# Patient Record
Sex: FEMALE | Race: WHITE | Employment: FULL TIME | URBAN - METROPOLITAN AREA
[De-identification: names, ages, dates, MRNs, and addresses within clinical notes are randomized per-mention and may not be internally consistent; named-entity substitution may affect disease eponyms.]

---

## 2024-04-17 ENCOUNTER — TRANSFERRED RECORDS (OUTPATIENT)
Dept: MULTI SPECIALTY CLINIC | Facility: CLINIC | Age: 59
End: 2024-04-17

## 2024-04-17 LAB
ALT SERPL-CCNC: 15 U/L
AST SERPL-CCNC: 26 U/L (ref 14–36)
CREATININE (EXTERNAL): 0.7 MG/DL (ref 0.7–1.4)
GLUCOSE (EXTERNAL): 86 MG/DL (ref 60–99)
POTASSIUM (EXTERNAL): 4.3 MMOL/L (ref 3.5–5.1)

## 2024-04-26 RX ORDER — CLOBETASOL PROPIONATE 0.5 MG/G
CREAM TOPICAL 2 TIMES DAILY
Status: ON HOLD | COMMUNITY
End: 2024-04-30

## 2024-04-26 RX ORDER — GABAPENTIN 100 MG/1
100 CAPSULE ORAL 3 TIMES DAILY
Status: ON HOLD | COMMUNITY
End: 2024-04-30

## 2024-04-26 RX ORDER — ESTRADIOL 0.1 MG/G
CREAM VAGINAL
Status: ON HOLD | COMMUNITY
End: 2024-04-30

## 2024-04-26 RX ORDER — AMOXICILLIN 500 MG/1
2000 TABLET, FILM COATED ORAL 2 TIMES DAILY
Status: ON HOLD | COMMUNITY
End: 2024-04-30

## 2024-04-26 RX ORDER — SUMATRIPTAN 50 MG/1
50 TABLET, FILM COATED ORAL
COMMUNITY

## 2024-04-28 NOTE — CONSULTS
GYNECOLOGIC ONCOLOGY CONSULT    Patient Name: MAYUR PINEDA Patient : 1965  Patient MRN: [id number]  Referring Physician: UBALDO Resendiz  Primary GYNOncologist: Ashley Augustine (Gynecological/Oncology)  Date of Service: 2024    Reason for Consult:  Treatment recommendations    History of Present Illness (Gyn Oncology):  Mayur Pineda is a 58-year-old female with a history of abdominal pain and bloating, who was identified to have a complex, solid-appearing, right ovarian mass, measuring up to 10.8 cm on a pelvic ultrasound 4/3/24. She presents today for treatment recommendations.    Clinical Course:  10/27/17: Mayur underwent a hysteroscopy, D&C with polypectomy for a history of postmenopausal bleeding. Pathology showed fragments of endometrial polyp and benign endometrium. Negative for atypia, hyperplasia, or malignancy.  2018: Pap smear NILM. HPV+.  2019: Pap smear NILM. HPV-.  23-23: She presented to ER with abdominal pain and nausea for 4 days. She has a history of gastric ulcers and anemia. She reports decrease in solid and fluid intake. She reports weakness. She was found to have upper GI bleed, related to gastric ulcer, treated with epi, bipolar cautery and 2x hemostatic clips. She was also found to have worsening anemia.  23: CT AP showed wall thickening of the gastric pylorus, suspicious for peptic ulcer disease. There is a curvilinear hyperdensity within the gastric lumen in this region suspicious for active hemorrhage.  8/3/23: She underwent an EGD, which was normal. Plan to continue PPI, send stool H. Pylori antigen and repeat EGD in 2 months to ensure adequate healing. She should continue to avoid NSAID medications.  23: She underwent an EGD. Pathology showed changes consistent with reactive gastropathy with focal active inflammation. Negative for Helicobacter pylori by immunostain.  10/5/23: She presented with urinary incontinence and was using Pose liners.  "She was also noticing accidental gas leakage. She was concerned that her perineum had \"disappeared\" and wanted to have it looked it. She was using an estrogen cream for it 2x a week as well as clobetasol.  Pap smear NILM. HPV-.  10/9/23: Abdominal ultrasound showed a circumscribed, solid-appearing, subcutaneous mass of the right lower abdominal quadrant at site of clinical concern does not have typical appearance of a lymph node. It measures 4 x 4.9 cm in cross-section and has a thickness of 1.3 cm. It could be a lipoma though appearance is nonspecific. No hernia demonstrated.  4/1/24: She presented with low back pain and abdominal bloating. She helped somebody move towards the end of last week and then she started to have low back pain after lifting something heavy. She feels like the pain is getting better. She has taken Tylenol for pain without improvement and a use of heat and ice which seems to be beneficial. At the same time as the low back pain, she also started to have some lower abdominal bloating. She also feels like she needs to have a bowel movement more frequently. She normally has a bowel movement once a day for the last 3 days she has been having a bowel movement 2-3 times per day.  4/2/24: CT AP showed a large, heterogeneous, right adnexal mass, measuring approximately 10 cm, may represent neoplasm or ovarian torsion. Pelvic ultrasound is recommended for further characterization. Trace right pleural effusion and small volume ascites and both pericolic gutters, of uncertain etiology.  4/3/24: Pelvic ultrasound showed a large, complex, solid-appearing, right adnexal lesion, measuring 10.8 cm at greatest diameter. This is consistent with the patient's previous CT scan. The mass appears to be ovarian in origin. The etiology of the mass includes a benign parenchyma and uterine subserosal or pedunculated fibroid cannot be entirely excluded. The differential diagnosis also includes malignant neoplasm. " Normal-appearing uterus with small endometrial polyp noted within the endometrium.    = 246.  Genetic Testing (Gyn Oncology):  N/A    Interval History (Gyn Oncology):  Marilyn presents today for treatment recommendations. She is doing well. She had a chest CT a couple of days after her most recent CT scan of abdomen and pelvis at [facility], due to breathing difficulties. She was informed she has fluid in her lungs and pneumonia from the chest CT. She was started on Zithromax for 5 days, which she completed yesterday with improvement. She had a mass over the abdominal wall in October of 2023 and there was suspicion for a lipoma. She has had the mass for a long time and was informed by several doctors that it was something other than a lipoma. She had a CT scan in October for suspicion for an hernia. She lifted something heavy that caused some back pain and noted abdominal bloating the next day. She mainly went in for mainly bloating. She denies issues with eating or drinking, nausea or vomiting. She has had some soft stools in the last couple of days but denies any other issues with her bowel or bladder habits. She has changed her diet and cut out sugar and carbs totally.    Review of Systems:  A complete 14-point review of systems is negative except as noted in the above history of present illness.    Past Medical History:  Right ovarian mass  Postmenopausal bleeding  Abdominal pain  Gastric ulcers  Nausea  Weakness  Acute blood loss anemia  Iron deficiency anemia  Melena  Urinary incontinence  Lichen sclerosus  Irregular menses  Vaginal atrophy  Right inguinal hernia  Migraine headaches  Ear itching  Eyelid dermatitis  Low back pain  Abdominal bloating  Menopausal vasomotor syndrome  Right pleural effusion  Ovarian cysts, ruptured  Chickenpox    Surgical History:  Hysteroscopy, D&C with polypectomy 10/27/17  EGDs x2  Left knee replacement 2006  [place] tooth extraction  Colonoscopy 7/13/16  Ovarian cyst removals  remotely  Uterine polyp removal x2  Blood transfusions due to gastric ulcer bleeding    OB/Gyn History:  Menarche age: 14  .  x2. Son x1. Daughter x1. First live birth at 31  LMP: -  Last mammogram 2023, normal  Last Pap smear 10/5/23, NILM, HPV-. History of an HPV+ Pap smear in 2018  Last colonoscopy 16. Recommended repeat in 10 years  History of vaginal estradiol use    Allergies#  ibuprofen    Medications:  Clobetasol Topical Cream 0.05 %  Sumatriptan Oral 25 mg tablet  Erythromycin Ophthalmic Ointment 0.5 %  Estradiol Vaginal Cream 0.01 % (0.1 mg/g)  Amoxicillin-Clavulanate Oral 875 mg-125 mg  Family History:  Maternal grandmother - Breast cancer in her 80s  Maternal grandfather - Mouth cancer. Heavy smoker    Social History:    Not sexually active  Lives in a house with daughter  Former smoker  No alcohol consumption  No illicit drug use    Health Maintenance:    Vital Signs:  Blood pressure: 115/76, Pulse: 82, Temperature: 98 F, Respirations: 16, O2 sat: 97%, Pain Scale: 0, Height: 67 in, Weight: 126.4 lb, BSA: 1.66, BMI: 19.8 kg/m2    Physical Exam (Gyn Oncology):  General: Alert and oriented x3, no acute distress. Thin female.  HEENT: Normocephalic, atraumatic.  Lymph node exam: No supraclavicular, submandibular, or cervical lymphadenopathy.  CV: Regular rate and rhythm, no murmurs, rubs or gallops.  Respiratory: Clear to auscultation bilaterally, no wheezes, rales, or rhonchi.  Abdomen: Soft, non-tender to palpation, no rebound or guarding.  Lower Extremity Exam: No lower extremity edema, no palpable cords. There is a palpable, 1.5 x 4 cm nodule within the right groin, approaching the mons pubis, at the internal component of the inguinal ligament.  Neuro: Cranial nerves II-XII intact, gross motor skills intact.  Genitourinary exam: Deferred.    Laboratory Data:    Imaging:  10/9/23: US Abdomen  IMPRESSION:  A circumscribed, solid-appearing, subcutaneous mass of the right lower  abdominal quadrant at site of clinical concern does not have typical appearance of a lymph node. It measures 4 x 4.9 cm in cross-section and has a thickness of 1.3 cm. It could be a lipoma though appearance is nonspecific. No hernia demonstrated.    4/2/24: CT AP  IMPRESSION:  A large, heterogeneous, right adnexal mass, measuring approximately 10 cm, may represent neoplasm or ovarian torsion. Pelvic ultrasound is recommended for further characterization. Trace right pleural effusion and small volume ascites and both pericolic gutters, of uncertain etiology.    4/2/24: US Pelvis  IMPRESSION:  A large, complex, solid-appearing, right adnexal lesion, measuring 10.8 cm at greatest diameter. This is consistent with the patient's previous CT scan. The mass appears to be ovarian in origin. The etiology of the mass includes a benign parenchyma and uterine subserosal or pedunculated fibroid cannot be entirely excluded. The differential diagnosis also includes malignant neoplasm. Normal-appearing uterus with small endometrial polyp noted within the endometrium.    Problems:  Gastric ulcer  Pelvic mass  Assessment & Plan (Gyn Oncology):  Marilyn Mistry is a 58-year-old female with a history of abdominal pain and bloating, who was identified to have a complex, solid-appearing, right ovarian mass, measuring up to 10.8 cm on a pelvic ultrasound 4/3/24. She presents today for treatment recommendations.  Complex, solid-appearing, right ovarian mass, measuring up to 10.8 cm - Marilyn, her son, and I reviewed her recent CT scan findings, identifying the very complex appearing mass, replacing what appears to be the right ovary. There does appear to be a second solid appearing complex mass, potentially replacing the left ovary additionally. We did review that her uterus does appear normal in general. We reviewed that the standard-of-care recommendations at this point, is to remove both ovaries and fallopian tubes, and I would recommend  a hysterectomy, in order to utilize a bag, to remove both ovarian masses safely transvaginally. We reviewed the potential need for surgical staging, which can include biopsies of the lymph nodes, along the aorta and in the pelvis and performance of an omental biopsy. We reviewed that I do not see any evidence of anything outside of the pelvis and did review that a small volume of ascites in the abdomen and within the right pleural space, is not diagnostic for a metastatic malignancy. We reviewed the risks of surgery that do include the possibility of needing a laparotomy. We also reviewed the risks of bleeding, infection, and potential damage to surrounding structures, including the bowel, bladder, and bilateral ureters. We reviewed general perioperative expectations and also discussed the need for a preoperative PET CT, to ensure there is no sign of a metastatic process, coming from elsewhere. We reviewed the need for preoperative history and physical, prior to surgery.  History of a gastric ulcer in September of 2023 - Uncertain etiology period We will follow up on the pathology reports from that biopsy, which demonstrated reactive gastropathy with focal inflammation, no diagnostic abnormality. There is a very low possibility that this could be a metastatic stomach cancer to the ovary. I would like a PET CT to rule this out first and if there is concern, we will potentially refer Marilyn for a repeat EGD, prior to surgical management, if necessary at that time.  Right groin mass - Uncertain etiology. On recent pelvic ultrasound, this appears to be consistent with a lipoma within the groin. By clinical exam, this appears most consistent with an indirect inguinal hernia, however, this has not recapitulated on multiple imaging studies. Continue to follow. I do not feel that this needs to be biopsied at this time.  Thank you very much for allowing me to take part in the care of this very sweet patient.    3 minutes in  reviewing records, 41 minutes in discussion, 2 minutes ordering labs.    Pain Care Management:  Pain Scale: 0    Patient Care needs:  Depressions Status: Not recorded on visit  Smoking Status: Smoking Tobacco : Never smoker; Smokeless Tobacco : none found; Vaping : none found  Documentation assistance provided by Elia Crain, scribing for Dr. Ashley Augustine, on 4/11/2024.  I, Dr. Ashley Augustine, personally performed the services described in this documentation, and it is both accurate and complete.    Ashley Augustine MD  Phone: [phone]  Fax: [phone]

## 2024-04-28 NOTE — H&P (VIEW-ONLY)
GYNECOLOGIC ONCOLOGY CONSULT    Patient Name: MAYUR PINEDA Patient : 1965  Patient MRN: [id number]  Referring Physician: UBALDO Resendiz  Primary GYNOncologist: Ashley Augustine (Gynecological/Oncology)  Date of Service: 2024    Reason for Consult:  Treatment recommendations    History of Present Illness (Gyn Oncology):  Mayur Pineda is a 58-year-old female with a history of abdominal pain and bloating, who was identified to have a complex, solid-appearing, right ovarian mass, measuring up to 10.8 cm on a pelvic ultrasound 4/3/24. She presents today for treatment recommendations.    Clinical Course:  10/27/17: Mayur underwent a hysteroscopy, D&C with polypectomy for a history of postmenopausal bleeding. Pathology showed fragments of endometrial polyp and benign endometrium. Negative for atypia, hyperplasia, or malignancy.  2018: Pap smear NILM. HPV+.  2019: Pap smear NILM. HPV-.  23-23: She presented to ER with abdominal pain and nausea for 4 days. She has a history of gastric ulcers and anemia. She reports decrease in solid and fluid intake. She reports weakness. She was found to have upper GI bleed, related to gastric ulcer, treated with epi, bipolar cautery and 2x hemostatic clips. She was also found to have worsening anemia.  23: CT AP showed wall thickening of the gastric pylorus, suspicious for peptic ulcer disease. There is a curvilinear hyperdensity within the gastric lumen in this region suspicious for active hemorrhage.  8/3/23: She underwent an EGD, which was normal. Plan to continue PPI, send stool H. Pylori antigen and repeat EGD in 2 months to ensure adequate healing. She should continue to avoid NSAID medications.  23: She underwent an EGD. Pathology showed changes consistent with reactive gastropathy with focal active inflammation. Negative for Helicobacter pylori by immunostain.  10/5/23: She presented with urinary incontinence and was using Pose liners.  "She was also noticing accidental gas leakage. She was concerned that her perineum had \"disappeared\" and wanted to have it looked it. She was using an estrogen cream for it 2x a week as well as clobetasol.  Pap smear NILM. HPV-.  10/9/23: Abdominal ultrasound showed a circumscribed, solid-appearing, subcutaneous mass of the right lower abdominal quadrant at site of clinical concern does not have typical appearance of a lymph node. It measures 4 x 4.9 cm in cross-section and has a thickness of 1.3 cm. It could be a lipoma though appearance is nonspecific. No hernia demonstrated.  4/1/24: She presented with low back pain and abdominal bloating. She helped somebody move towards the end of last week and then she started to have low back pain after lifting something heavy. She feels like the pain is getting better. She has taken Tylenol for pain without improvement and a use of heat and ice which seems to be beneficial. At the same time as the low back pain, she also started to have some lower abdominal bloating. She also feels like she needs to have a bowel movement more frequently. She normally has a bowel movement once a day for the last 3 days she has been having a bowel movement 2-3 times per day.  4/2/24: CT AP showed a large, heterogeneous, right adnexal mass, measuring approximately 10 cm, may represent neoplasm or ovarian torsion. Pelvic ultrasound is recommended for further characterization. Trace right pleural effusion and small volume ascites and both pericolic gutters, of uncertain etiology.  4/3/24: Pelvic ultrasound showed a large, complex, solid-appearing, right adnexal lesion, measuring 10.8 cm at greatest diameter. This is consistent with the patient's previous CT scan. The mass appears to be ovarian in origin. The etiology of the mass includes a benign parenchyma and uterine subserosal or pedunculated fibroid cannot be entirely excluded. The differential diagnosis also includes malignant neoplasm. " Normal-appearing uterus with small endometrial polyp noted within the endometrium.    = 246.  Genetic Testing (Gyn Oncology):  N/A    Interval History (Gyn Oncology):  Marilyn presents today for treatment recommendations. She is doing well. She had a chest CT a couple of days after her most recent CT scan of abdomen and pelvis at [place], due to breathing difficulties. She was informed she has fluid in her lungs and pneumonia from the chest CT. She was started on Zithromax for 5 days, which she completed yesterday with improvement. She had a mass over the abdominal wall in October of 2023 and there was suspicion for a lipoma. She has had the mass for a long time and was informed by several doctors that it was something other than a lipoma. She had a CT scan in October for suspicion for an hernia. She lifted something heavy that caused some back pain and noted abdominal bloating the next day. She mainly went in for mainly bloating. She denies issues with eating or drinking, nausea or vomiting. She has had some soft stools in the last couple of days but denies any other issues with her bowel or bladder habits. She has changed her diet and cut out sugar and carbs totally.    Review of Systems:  A complete 14-point review of systems is negative except as noted in the above history of present illness.    Past Medical History:  Right ovarian mass  Postmenopausal bleeding  Abdominal pain  Gastric ulcers  Nausea  Weakness  Acute blood loss anemia  Iron deficiency anemia  Melena  Urinary incontinence  Lichen sclerosus  Irregular menses  Vaginal atrophy  Right inguinal hernia  Migraine headaches  Ear itching  Eyelid dermatitis  Low back pain  Abdominal bloating  Menopausal vasomotor syndrome  Right pleural effusion  Ovarian cysts, ruptured  Chickenpox    Surgical History:  Hysteroscopy, D&C with polypectomy 10/27/17  EGDs x2  Left knee replacement 2006  [place] tooth extraction  Colonoscopy 7/13/16  Ovarian cyst removals  remotely  Uterine polyp removal x2  Blood transfusions due to gastric ulcer bleeding    OB/Gyn History:  Menarche age: 14  .  x2. Son x1. Daughter x1. First live birth at 31  LMP: -  Last mammogram 2023, normal  Last Pap smear 10/5/23, NILM, HPV-. History of an HPV+ Pap smear in 2018  Last colonoscopy 16. Recommended repeat in 10 years  History of vaginal estradiol use    Allergies#  ibuprofen    Medications:  Clobetasol Topical Cream 0.05 %  Sumatriptan Oral 25 mg tablet  Erythromycin Ophthalmic Ointment 0.5 %  Estradiol Vaginal Cream 0.01 % (0.1 mg/g)  Amoxicillin-Clavulanate Oral 875 mg-125 mg  Family History:  Maternal grandmother - Breast cancer in her 80s  Maternal grandfather - Mouth cancer. Heavy smoker    Social History:    Not sexually active  Lives in a house with daughter  Former smoker  No alcohol consumption  No illicit drug use    Health Maintenance:    Vital Signs:  Blood pressure: 115/76, Pulse: 82, Temperature: 98 F, Respirations: 16, O2 sat: 97%, Pain Scale: 0, Height: 67 in, Weight: 126.4 lb, BSA: 1.66, BMI: 19.8 kg/m2    Physical Exam (Gyn Oncology):  General: Alert and oriented x3, no acute distress. Thin female.  HEENT: Normocephalic, atraumatic.  Lymph node exam: No supraclavicular, submandibular, or cervical lymphadenopathy.  CV: Regular rate and rhythm, no murmurs, rubs or gallops.  Respiratory: Clear to auscultation bilaterally, no wheezes, rales, or rhonchi.  Abdomen: Soft, non-tender to palpation, no rebound or guarding.  Lower Extremity Exam: No lower extremity edema, no palpable cords. There is a palpable, 1.5 x 4 cm nodule within the right groin, approaching the mons pubis, at the internal component of the inguinal ligament.  Neuro: Cranial nerves II-XII intact, gross motor skills intact.  Genitourinary exam: Deferred.    Laboratory Data:    Imaging:  10/9/23: US Abdomen  IMPRESSION:  A circumscribed, solid-appearing, subcutaneous mass of the right lower  abdominal quadrant at site of clinical concern does not have typical appearance of a lymph node. It measures 4 x 4.9 cm in cross-section and has a thickness of 1.3 cm. It could be a lipoma though appearance is nonspecific. No hernia demonstrated.    4/2/24: CT AP  IMPRESSION:  A large, heterogeneous, right adnexal mass, measuring approximately 10 cm, may represent neoplasm or ovarian torsion. Pelvic ultrasound is recommended for further characterization. Trace right pleural effusion and small volume ascites and both pericolic gutters, of uncertain etiology.    4/2/24: US Pelvis  IMPRESSION:  A large, complex, solid-appearing, right adnexal lesion, measuring 10.8 cm at greatest diameter. This is consistent with the patient's previous CT scan. The mass appears to be ovarian in origin. The etiology of the mass includes a benign parenchyma and uterine subserosal or pedunculated fibroid cannot be entirely excluded. The differential diagnosis also includes malignant neoplasm. Normal-appearing uterus with small endometrial polyp noted within the endometrium.    Problems:  Gastric ulcer  Pelvic mass  Assessment & Plan (Gyn Oncology):  Marilyn Mistry is a 58-year-old female with a history of abdominal pain and bloating, who was identified to have a complex, solid-appearing, right ovarian mass, measuring up to 10.8 cm on a pelvic ultrasound 4/3/24. She presents today for treatment recommendations.  Complex, solid-appearing, right ovarian mass, measuring up to 10.8 cm - Marilyn, her son, and I reviewed her recent CT scan findings, identifying the very complex appearing mass, replacing what appears to be the right ovary. There does appear to be a second solid appearing complex mass, potentially replacing the left ovary additionally. We did review that her uterus does appear normal in general. We reviewed that the standard-of-care recommendations at this point, is to remove both ovaries and fallopian tubes, and I would recommend  a hysterectomy, in order to utilize a bag, to remove both ovarian masses safely transvaginally. We reviewed the potential need for surgical staging, which can include biopsies of the lymph nodes, along the aorta and in the pelvis and performance of an omental biopsy. We reviewed that I do not see any evidence of anything outside of the pelvis and did review that a small volume of ascites in the abdomen and within the right pleural space, is not diagnostic for a metastatic malignancy. We reviewed the risks of surgery that do include the possibility of needing a laparotomy. We also reviewed the risks of bleeding, infection, and potential damage to surrounding structures, including the bowel, bladder, and bilateral ureters. We reviewed general perioperative expectations and also discussed the need for a preoperative PET CT, to ensure there is no sign of a metastatic process, coming from elsewhere. We reviewed the need for preoperative history and physical, prior to surgery.  History of a gastric ulcer in September of 2023 - Uncertain etiology period We will follow up on the pathology reports from that biopsy, which demonstrated reactive gastropathy with focal inflammation, no diagnostic abnormality. There is a very low possibility that this could be a metastatic stomach cancer to the ovary. I would like a PET CT to rule this out first and if there is concern, we will potentially refer Marilyn for a repeat EGD, prior to surgical management, if necessary at that time.  Right groin mass - Uncertain etiology. On recent pelvic ultrasound, this appears to be consistent with a lipoma within the groin. By clinical exam, this appears most consistent with an indirect inguinal hernia, however, this has not recapitulated on multiple imaging studies. Continue to follow. I do not feel that this needs to be biopsied at this time.  Thank you very much for allowing me to take part in the care of this very sweet patient.    3 minutes in  reviewing records, 41 minutes in discussion, 2 minutes ordering labs.    Pain Care Management:  Pain Scale: 0    Patient Care needs:  Depressions Status: Not recorded on visit  Smoking Status: Smoking Tobacco : Never smoker; Smokeless Tobacco : none found; Vaping : none found  Documentation assistance provided by Elia Crain, scribing for Dr. Ashley Augustine, on 4/11/2024.  I, Dr. Ashley Augustine, personally performed the services described in this documentation, and it is both accurate and complete.    Ashley Augustine MD  Phone: [phone]  Fax: [phone]

## 2024-04-30 ENCOUNTER — ANESTHESIA (OUTPATIENT)
Dept: SURGERY | Facility: HOSPITAL | Age: 59
End: 2024-04-30
Payer: COMMERCIAL

## 2024-04-30 ENCOUNTER — ANESTHESIA EVENT (OUTPATIENT)
Dept: SURGERY | Facility: HOSPITAL | Age: 59
End: 2024-04-30
Payer: COMMERCIAL

## 2024-04-30 ENCOUNTER — HOSPITAL ENCOUNTER (OUTPATIENT)
Facility: HOSPITAL | Age: 59
Discharge: HOME | End: 2024-04-30
Attending: OBSTETRICS & GYNECOLOGY | Admitting: OBSTETRICS & GYNECOLOGY
Payer: COMMERCIAL

## 2024-04-30 VITALS
HEIGHT: 67 IN | HEART RATE: 105 BPM | OXYGEN SATURATION: 93 % | BODY MASS INDEX: 19.15 KG/M2 | SYSTOLIC BLOOD PRESSURE: 99 MMHG | RESPIRATION RATE: 27 BRPM | WEIGHT: 122 LBS | DIASTOLIC BLOOD PRESSURE: 54 MMHG | TEMPERATURE: 98.3 F

## 2024-04-30 DIAGNOSIS — G89.18 POSTOPERATIVE PAIN: Primary | ICD-10-CM

## 2024-04-30 LAB
HCG INTACT+B SERPL-ACNC: 2 MIU/ML
HGB BLD-MCNC: 14.2 G/DL (ref 11.7–15.7)

## 2024-04-30 PROCEDURE — 250N000011 HC RX IP 250 OP 636: Performed by: ANESTHESIOLOGY

## 2024-04-30 PROCEDURE — 88342 IMHCHEM/IMCYTCHM 1ST ANTB: CPT | Mod: 26 | Performed by: PATHOLOGY

## 2024-04-30 PROCEDURE — 999N000141 HC STATISTIC PRE-PROCEDURE NURSING ASSESSMENT: Performed by: OBSTETRICS & GYNECOLOGY

## 2024-04-30 PROCEDURE — 250N000011 HC RX IP 250 OP 636: Performed by: OBSTETRICS & GYNECOLOGY

## 2024-04-30 PROCEDURE — 258N000001 HC RX 258: Performed by: OBSTETRICS & GYNECOLOGY

## 2024-04-30 PROCEDURE — 360N000080 HC SURGERY LEVEL 7, PER MIN: Performed by: OBSTETRICS & GYNECOLOGY

## 2024-04-30 PROCEDURE — 710N000012 HC RECOVERY PHASE 2, PER MINUTE: Performed by: OBSTETRICS & GYNECOLOGY

## 2024-04-30 PROCEDURE — 85018 HEMOGLOBIN: CPT | Performed by: PHYSICIAN ASSISTANT

## 2024-04-30 PROCEDURE — 88307 TISSUE EXAM BY PATHOLOGIST: CPT | Mod: 26 | Performed by: PATHOLOGY

## 2024-04-30 PROCEDURE — 250N000013 HC RX MED GY IP 250 OP 250 PS 637: Performed by: PHYSICIAN ASSISTANT

## 2024-04-30 PROCEDURE — 250N000011 HC RX IP 250 OP 636: Mod: JZ | Performed by: PHYSICIAN ASSISTANT

## 2024-04-30 PROCEDURE — 88302 TISSUE EXAM BY PATHOLOGIST: CPT | Mod: 26 | Performed by: PATHOLOGY

## 2024-04-30 PROCEDURE — 272N000001 HC OR GENERAL SUPPLY STERILE: Performed by: OBSTETRICS & GYNECOLOGY

## 2024-04-30 PROCEDURE — 84702 CHORIONIC GONADOTROPIN TEST: CPT | Performed by: PHYSICIAN ASSISTANT

## 2024-04-30 PROCEDURE — 88112 CYTOPATH CELL ENHANCE TECH: CPT | Mod: 26 | Performed by: PATHOLOGY

## 2024-04-30 PROCEDURE — 250N000025 HC SEVOFLURANE, PER MIN: Performed by: OBSTETRICS & GYNECOLOGY

## 2024-04-30 PROCEDURE — 250N000009 HC RX 250: Performed by: ANESTHESIOLOGY

## 2024-04-30 PROCEDURE — 88331 PATH CONSLTJ SURG 1 BLK 1SPC: CPT | Mod: 26 | Performed by: PATHOLOGY

## 2024-04-30 PROCEDURE — 258N000003 HC RX IP 258 OP 636: Performed by: ANESTHESIOLOGY

## 2024-04-30 PROCEDURE — 250N000011 HC RX IP 250 OP 636: Performed by: NURSE ANESTHETIST, CERTIFIED REGISTERED

## 2024-04-30 PROCEDURE — 710N000009 HC RECOVERY PHASE 1, LEVEL 1, PER MIN: Performed by: OBSTETRICS & GYNECOLOGY

## 2024-04-30 PROCEDURE — 250N000013 HC RX MED GY IP 250 OP 250 PS 637: Performed by: ANESTHESIOLOGY

## 2024-04-30 PROCEDURE — 88305 TISSUE EXAM BY PATHOLOGIST: CPT | Mod: 26 | Performed by: PATHOLOGY

## 2024-04-30 PROCEDURE — 36415 COLL VENOUS BLD VENIPUNCTURE: CPT | Performed by: PHYSICIAN ASSISTANT

## 2024-04-30 PROCEDURE — 250N000011 HC RX IP 250 OP 636: Performed by: PHYSICIAN ASSISTANT

## 2024-04-30 PROCEDURE — 88331 PATH CONSLTJ SURG 1 BLK 1SPC: CPT | Mod: TC | Performed by: OBSTETRICS & GYNECOLOGY

## 2024-04-30 PROCEDURE — 250N000009 HC RX 250: Performed by: OBSTETRICS & GYNECOLOGY

## 2024-04-30 PROCEDURE — 370N000017 HC ANESTHESIA TECHNICAL FEE, PER MIN: Performed by: OBSTETRICS & GYNECOLOGY

## 2024-04-30 PROCEDURE — 88305 TISSUE EXAM BY PATHOLOGIST: CPT | Mod: TC | Performed by: OBSTETRICS & GYNECOLOGY

## 2024-04-30 PROCEDURE — 88332 PATH CONSLTJ SURG EA ADD BLK: CPT | Mod: 26 | Performed by: PATHOLOGY

## 2024-04-30 PROCEDURE — 88341 IMHCHEM/IMCYTCHM EA ADD ANTB: CPT | Mod: 26 | Performed by: PATHOLOGY

## 2024-04-30 RX ORDER — AMOXICILLIN 250 MG
1-2 CAPSULE ORAL 2 TIMES DAILY PRN
COMMUNITY
Start: 2024-04-30

## 2024-04-30 RX ORDER — FENTANYL CITRATE 50 UG/ML
25-100 INJECTION, SOLUTION INTRAMUSCULAR; INTRAVENOUS
Status: DISCONTINUED | OUTPATIENT
Start: 2024-04-30 | End: 2024-04-30 | Stop reason: HOSPADM

## 2024-04-30 RX ORDER — SODIUM CHLORIDE, SODIUM LACTATE, POTASSIUM CHLORIDE, CALCIUM CHLORIDE 600; 310; 30; 20 MG/100ML; MG/100ML; MG/100ML; MG/100ML
INJECTION, SOLUTION INTRAVENOUS CONTINUOUS
Status: DISCONTINUED | OUTPATIENT
Start: 2024-04-30 | End: 2024-04-30 | Stop reason: HOSPADM

## 2024-04-30 RX ORDER — CEFAZOLIN SODIUM/WATER 2 G/20 ML
2 SYRINGE (ML) INTRAVENOUS
Status: COMPLETED | OUTPATIENT
Start: 2024-04-30 | End: 2024-04-30

## 2024-04-30 RX ORDER — KETAMINE HYDROCHLORIDE 10 MG/ML
INJECTION INTRAMUSCULAR; INTRAVENOUS PRN
Status: DISCONTINUED | OUTPATIENT
Start: 2024-04-30 | End: 2024-04-30

## 2024-04-30 RX ORDER — OXYCODONE HYDROCHLORIDE 5 MG/1
5 TABLET ORAL EVERY 4 HOURS PRN
Qty: 8 TABLET | Refills: 0 | Status: SHIPPED | OUTPATIENT
Start: 2024-04-30 | End: 2024-05-03

## 2024-04-30 RX ORDER — EPHEDRINE SULFATE 50 MG/ML
INJECTION, SOLUTION INTRAMUSCULAR; INTRAVENOUS; SUBCUTANEOUS PRN
Status: DISCONTINUED | OUTPATIENT
Start: 2024-04-30 | End: 2024-04-30

## 2024-04-30 RX ORDER — SCOLOPAMINE TRANSDERMAL SYSTEM 1 MG/1
1 PATCH, EXTENDED RELEASE TRANSDERMAL ONCE
Status: DISCONTINUED | OUTPATIENT
Start: 2024-04-30 | End: 2024-04-30 | Stop reason: HOSPADM

## 2024-04-30 RX ORDER — NICOTINE POLACRILEX 4 MG/1
20 GUM, CHEWING ORAL DAILY
COMMUNITY

## 2024-04-30 RX ORDER — HYDROXYZINE HYDROCHLORIDE 25 MG/1
25 TABLET, FILM COATED ORAL
Status: COMPLETED | OUTPATIENT
Start: 2024-04-30 | End: 2024-04-30

## 2024-04-30 RX ORDER — ACETAMINOPHEN 325 MG/1
975 TABLET ORAL ONCE
Status: COMPLETED | OUTPATIENT
Start: 2024-04-30 | End: 2024-04-30

## 2024-04-30 RX ORDER — CEFAZOLIN SODIUM/WATER 2 G/20 ML
2 SYRINGE (ML) INTRAVENOUS SEE ADMIN INSTRUCTIONS
Status: DISCONTINUED | OUTPATIENT
Start: 2024-04-30 | End: 2024-04-30 | Stop reason: HOSPADM

## 2024-04-30 RX ORDER — MAGNESIUM SULFATE 4 G/50ML
4 INJECTION INTRAVENOUS ONCE
Status: COMPLETED | OUTPATIENT
Start: 2024-04-30 | End: 2024-04-30

## 2024-04-30 RX ORDER — OXYCODONE HYDROCHLORIDE 5 MG/1
5 TABLET ORAL
Status: COMPLETED | OUTPATIENT
Start: 2024-04-30 | End: 2024-04-30

## 2024-04-30 RX ORDER — NALOXONE HYDROCHLORIDE 0.4 MG/ML
0.1 INJECTION, SOLUTION INTRAMUSCULAR; INTRAVENOUS; SUBCUTANEOUS
Status: DISCONTINUED | OUTPATIENT
Start: 2024-04-30 | End: 2024-04-30 | Stop reason: HOSPADM

## 2024-04-30 RX ORDER — OXYCODONE HYDROCHLORIDE 5 MG/1
10 TABLET ORAL
Status: DISCONTINUED | OUTPATIENT
Start: 2024-04-30 | End: 2024-04-30 | Stop reason: HOSPADM

## 2024-04-30 RX ORDER — LIDOCAINE HYDROCHLORIDE 10 MG/ML
INJECTION, SOLUTION INFILTRATION; PERINEURAL PRN
Status: DISCONTINUED | OUTPATIENT
Start: 2024-04-30 | End: 2024-04-30

## 2024-04-30 RX ORDER — NALOXONE HYDROCHLORIDE 0.4 MG/ML
0.2 INJECTION, SOLUTION INTRAMUSCULAR; INTRAVENOUS; SUBCUTANEOUS
Status: DISCONTINUED | OUTPATIENT
Start: 2024-04-30 | End: 2024-04-30 | Stop reason: HOSPADM

## 2024-04-30 RX ORDER — LIDOCAINE 40 MG/G
CREAM TOPICAL
Status: DISCONTINUED | OUTPATIENT
Start: 2024-04-30 | End: 2024-04-30 | Stop reason: HOSPADM

## 2024-04-30 RX ORDER — HYDROMORPHONE HYDROCHLORIDE 1 MG/ML
0.4 INJECTION, SOLUTION INTRAMUSCULAR; INTRAVENOUS; SUBCUTANEOUS EVERY 5 MIN PRN
Status: DISCONTINUED | OUTPATIENT
Start: 2024-04-30 | End: 2024-04-30 | Stop reason: HOSPADM

## 2024-04-30 RX ORDER — DEXAMETHASONE SODIUM PHOSPHATE 4 MG/ML
INJECTION, SOLUTION INTRA-ARTICULAR; INTRALESIONAL; INTRAMUSCULAR; INTRAVENOUS; SOFT TISSUE PRN
Status: DISCONTINUED | OUTPATIENT
Start: 2024-04-30 | End: 2024-04-30

## 2024-04-30 RX ORDER — ACETAMINOPHEN 325 MG/1
975 TABLET ORAL EVERY 6 HOURS PRN
Status: CANCELLED | OUTPATIENT
Start: 2024-04-30

## 2024-04-30 RX ORDER — ONDANSETRON 2 MG/ML
INJECTION INTRAMUSCULAR; INTRAVENOUS PRN
Status: DISCONTINUED | OUTPATIENT
Start: 2024-04-30 | End: 2024-04-30

## 2024-04-30 RX ORDER — DEXAMETHASONE SODIUM PHOSPHATE 10 MG/ML
INJECTION, SOLUTION INTRAMUSCULAR; INTRAVENOUS PRN
Status: DISCONTINUED | OUTPATIENT
Start: 2024-04-30 | End: 2024-04-30

## 2024-04-30 RX ORDER — ACETAMINOPHEN 500 MG
500-1000 TABLET ORAL EVERY 6 HOURS PRN
COMMUNITY
Start: 2024-04-30

## 2024-04-30 RX ORDER — METRONIDAZOLE 500 MG/100ML
500 INJECTION, SOLUTION INTRAVENOUS
Status: COMPLETED | OUTPATIENT
Start: 2024-04-30 | End: 2024-04-30

## 2024-04-30 RX ORDER — ONDANSETRON 4 MG/1
4 TABLET, ORALLY DISINTEGRATING ORAL EVERY 30 MIN PRN
Status: DISCONTINUED | OUTPATIENT
Start: 2024-04-30 | End: 2024-04-30 | Stop reason: HOSPADM

## 2024-04-30 RX ORDER — NALOXONE HYDROCHLORIDE 0.4 MG/ML
0.4 INJECTION, SOLUTION INTRAMUSCULAR; INTRAVENOUS; SUBCUTANEOUS
Status: DISCONTINUED | OUTPATIENT
Start: 2024-04-30 | End: 2024-04-30 | Stop reason: HOSPADM

## 2024-04-30 RX ORDER — BUPIVACAINE HYDROCHLORIDE 5 MG/ML
INJECTION, SOLUTION PERINEURAL PRN
Status: DISCONTINUED | OUTPATIENT
Start: 2024-04-30 | End: 2024-04-30 | Stop reason: HOSPADM

## 2024-04-30 RX ORDER — FENTANYL CITRATE 50 UG/ML
50 INJECTION, SOLUTION INTRAMUSCULAR; INTRAVENOUS EVERY 5 MIN PRN
Status: DISCONTINUED | OUTPATIENT
Start: 2024-04-30 | End: 2024-04-30 | Stop reason: HOSPADM

## 2024-04-30 RX ORDER — HYDROMORPHONE HYDROCHLORIDE 1 MG/ML
0.2 INJECTION, SOLUTION INTRAMUSCULAR; INTRAVENOUS; SUBCUTANEOUS EVERY 5 MIN PRN
Status: DISCONTINUED | OUTPATIENT
Start: 2024-04-30 | End: 2024-04-30 | Stop reason: HOSPADM

## 2024-04-30 RX ORDER — ONDANSETRON 2 MG/ML
4 INJECTION INTRAMUSCULAR; INTRAVENOUS EVERY 30 MIN PRN
Status: DISCONTINUED | OUTPATIENT
Start: 2024-04-30 | End: 2024-04-30 | Stop reason: HOSPADM

## 2024-04-30 RX ORDER — FENTANYL CITRATE 50 UG/ML
INJECTION, SOLUTION INTRAMUSCULAR; INTRAVENOUS PRN
Status: DISCONTINUED | OUTPATIENT
Start: 2024-04-30 | End: 2024-04-30

## 2024-04-30 RX ORDER — FENTANYL CITRATE 50 UG/ML
25 INJECTION, SOLUTION INTRAMUSCULAR; INTRAVENOUS EVERY 5 MIN PRN
Status: DISCONTINUED | OUTPATIENT
Start: 2024-04-30 | End: 2024-04-30 | Stop reason: HOSPADM

## 2024-04-30 RX ORDER — PROPOFOL 10 MG/ML
INJECTION, EMULSION INTRAVENOUS PRN
Status: DISCONTINUED | OUTPATIENT
Start: 2024-04-30 | End: 2024-04-30

## 2024-04-30 RX ORDER — PROPOFOL 10 MG/ML
INJECTION, EMULSION INTRAVENOUS CONTINUOUS PRN
Status: DISCONTINUED | OUTPATIENT
Start: 2024-04-30 | End: 2024-04-30

## 2024-04-30 RX ORDER — MAGNESIUM HYDROXIDE 1200 MG/15ML
LIQUID ORAL PRN
Status: DISCONTINUED | OUTPATIENT
Start: 2024-04-30 | End: 2024-04-30 | Stop reason: HOSPADM

## 2024-04-30 RX ADMIN — PHENYLEPHRINE HYDROCHLORIDE 100 MCG: 10 INJECTION INTRAVENOUS at 10:45

## 2024-04-30 RX ADMIN — ROCURONIUM BROMIDE 50 MG: 50 INJECTION, SOLUTION INTRAVENOUS at 10:24

## 2024-04-30 RX ADMIN — ACETAMINOPHEN 975 MG: 325 TABLET ORAL at 09:34

## 2024-04-30 RX ADMIN — ROCURONIUM BROMIDE 20 MG: 50 INJECTION, SOLUTION INTRAVENOUS at 11:04

## 2024-04-30 RX ADMIN — HYDROMORPHONE HYDROCHLORIDE 0.2 MG: 1 INJECTION, SOLUTION INTRAMUSCULAR; INTRAVENOUS; SUBCUTANEOUS at 15:20

## 2024-04-30 RX ADMIN — HYDROMORPHONE HYDROCHLORIDE 0.4 MG: 1 INJECTION, SOLUTION INTRAMUSCULAR; INTRAVENOUS; SUBCUTANEOUS at 15:50

## 2024-04-30 RX ADMIN — SUGAMMADEX 200 MG: 100 INJECTION, SOLUTION INTRAVENOUS at 13:57

## 2024-04-30 RX ADMIN — OXYCODONE HYDROCHLORIDE 5 MG: 5 TABLET ORAL at 15:55

## 2024-04-30 RX ADMIN — PHENYLEPHRINE HYDROCHLORIDE 100 MCG: 10 INJECTION INTRAVENOUS at 12:46

## 2024-04-30 RX ADMIN — ONDANSETRON 4 MG: 2 INJECTION INTRAMUSCULAR; INTRAVENOUS at 15:47

## 2024-04-30 RX ADMIN — SODIUM CHLORIDE, POTASSIUM CHLORIDE, SODIUM LACTATE AND CALCIUM CHLORIDE: 600; 310; 30; 20 INJECTION, SOLUTION INTRAVENOUS at 11:35

## 2024-04-30 RX ADMIN — FENTANYL CITRATE 100 MCG: 50 INJECTION INTRAMUSCULAR; INTRAVENOUS at 10:12

## 2024-04-30 RX ADMIN — FENTANYL CITRATE 50 MCG: 50 INJECTION, SOLUTION INTRAMUSCULAR; INTRAVENOUS at 14:57

## 2024-04-30 RX ADMIN — PROPOFOL 100 MG: 10 INJECTION, EMULSION INTRAVENOUS at 10:24

## 2024-04-30 RX ADMIN — DEXAMETHASONE SODIUM PHOSPHATE 10 MG: 4 INJECTION, SOLUTION INTRA-ARTICULAR; INTRALESIONAL; INTRAMUSCULAR; INTRAVENOUS; SOFT TISSUE at 10:47

## 2024-04-30 RX ADMIN — Medication 5 MG: at 11:59

## 2024-04-30 RX ADMIN — PROPOFOL 80 MCG/KG/MIN: 10 INJECTION, EMULSION INTRAVENOUS at 10:43

## 2024-04-30 RX ADMIN — SODIUM CHLORIDE, POTASSIUM CHLORIDE, SODIUM LACTATE AND CALCIUM CHLORIDE: 600; 310; 30; 20 INJECTION, SOLUTION INTRAVENOUS at 09:35

## 2024-04-30 RX ADMIN — SCOPALAMINE 1 PATCH: 1 PATCH, EXTENDED RELEASE TRANSDERMAL at 09:36

## 2024-04-30 RX ADMIN — Medication 5 MG: at 11:56

## 2024-04-30 RX ADMIN — FENTANYL CITRATE 50 MCG: 50 INJECTION, SOLUTION INTRAMUSCULAR; INTRAVENOUS at 14:52

## 2024-04-30 RX ADMIN — Medication 2 G: at 10:30

## 2024-04-30 RX ADMIN — METRONIDAZOLE 500 MG: 500 INJECTION, SOLUTION INTRAVENOUS at 09:42

## 2024-04-30 RX ADMIN — OXYCODONE HYDROCHLORIDE 5 MG: 5 TABLET ORAL at 17:28

## 2024-04-30 RX ADMIN — MAGNESIUM SULFATE HEPTAHYDRATE 4 G: 80 INJECTION, SOLUTION INTRAVENOUS at 09:36

## 2024-04-30 RX ADMIN — ROCURONIUM BROMIDE 10 MG: 50 INJECTION, SOLUTION INTRAVENOUS at 13:16

## 2024-04-30 RX ADMIN — HYDROXYZINE HYDROCHLORIDE 25 MG: 25 TABLET, FILM COATED ORAL at 15:55

## 2024-04-30 RX ADMIN — KETAMINE HYDROCHLORIDE 50 MG: 10 INJECTION INTRAMUSCULAR; INTRAVENOUS at 10:24

## 2024-04-30 RX ADMIN — LIDOCAINE HYDROCHLORIDE 50 MG: 10 INJECTION, SOLUTION INFILTRATION; PERINEURAL at 10:24

## 2024-04-30 RX ADMIN — MIDAZOLAM 2 MG: 1 INJECTION INTRAMUSCULAR; INTRAVENOUS at 10:07

## 2024-04-30 RX ADMIN — ROCURONIUM BROMIDE 20 MG: 50 INJECTION, SOLUTION INTRAVENOUS at 12:15

## 2024-04-30 RX ADMIN — Medication 5 MG: at 11:24

## 2024-04-30 RX ADMIN — HYDROMORPHONE HYDROCHLORIDE 0.2 MG: 1 INJECTION, SOLUTION INTRAMUSCULAR; INTRAVENOUS; SUBCUTANEOUS at 15:28

## 2024-04-30 RX ADMIN — ONDANSETRON 4 MG: 2 INJECTION INTRAMUSCULAR; INTRAVENOUS at 13:53

## 2024-04-30 RX ADMIN — PHENYLEPHRINE HYDROCHLORIDE 100 MCG: 10 INJECTION INTRAVENOUS at 11:31

## 2024-04-30 RX ADMIN — Medication 5 MG: at 11:31

## 2024-04-30 RX ADMIN — Medication 5 MG: at 11:07

## 2024-04-30 RX ADMIN — PHENYLEPHRINE HYDROCHLORIDE 50 MCG: 10 INJECTION INTRAVENOUS at 13:40

## 2024-04-30 ASSESSMENT — ACTIVITIES OF DAILY LIVING (ADL)
ADLS_ACUITY_SCORE: 20
ADLS_ACUITY_SCORE: 29
ADLS_ACUITY_SCORE: 20

## 2024-04-30 NOTE — INTERVAL H&P NOTE
I have seen and examined the patient. There are no updates or changes to the preoperative history and physical.    Ashley Augustine MD  Gynecologic Oncology  [facility]  [facility]: [phone]

## 2024-04-30 NOTE — ANESTHESIA PREPROCEDURE EVALUATION
"Anesthesia Pre-Procedure Evaluation    Patient: Marilyn Mistry   MRN: [id number] : 1965        Procedure : Procedure(s):  ROBOTIC ASSISTED TOTAL LAPAROSCOPIC HYSTERECTOMY, BILATERAL SALPINGO-OOPHORECTOMY,  POSSIBLE STAGING, POSSIBLE EXPLORATORY LAPAROTOMY          Past Medical History:   Diagnosis Date    Anemia, iron deficiency     Gastric ulcer     Gastroesophageal reflux disease     Lichen sclerosus     Menopausal vasomotor syndrome     Pelvic mass     Right inguinal hernia       Past Surgical History:   Procedure Laterality Date    COLONOSCOPY      EGD      HYSTEROSCOPY      REPLACEMENT TOTAL KNEE Left 2006    WISDOM TOOTH EXTRACTION        Allergies   Allergen Reactions    Ibuprofen Other (See Comments)     Bleeding ulcer      Social History     Tobacco Use    Smoking status: Never    Smokeless tobacco: Never   Substance Use Topics    Alcohol use: Not Currently      Wt Readings from Last 1 Encounters:   24 55.3 kg (122 lb)        Anesthesia Evaluation   Pt has had prior anesthetic.         ROS/MED HX  ENT/Pulmonary:       Neurologic:       Cardiovascular:       METS/Exercise Tolerance:     Hematologic:       Musculoskeletal:       GI/Hepatic:     (+) GERD,                   Renal/Genitourinary:       Endo:       Psychiatric/Substance Use:       Infectious Disease:       Malignancy:       Other:            Physical Exam    Airway        Mallampati: II    Neck ROM: full     Respiratory Devices and Support         Dental       (+) Minor Abnormalities - some fillings, tiny chips      Cardiovascular   cardiovascular exam normal          Pulmonary   pulmonary exam normal                OUTSIDE LABS:  CBC:   Lab Results   Component Value Date    HGB 14.2 2024     BMP: No results found for: \"NA\", \"POTASSIUM\", \"CHLORIDE\", \"CO2\", \"BUN\", \"CR\", \"GLC\"  COAGS: No results found for: \"PTT\", \"INR\", \"FIBR\"  POC: No results found for: \"BGM\", \"HCG\", \"HCGS\"  HEPATIC: No results found for: \"ALBUMIN\", \"PROTTOTAL\", " "\"ALT\", \"AST\", \"GGT\", \"ALKPHOS\", \"BILITOTAL\", \"BILIDIRECT\", \"VERONICA\"  OTHER: No results found for: \"PH\", \"LACT\", \"A1C\", \"ALEXANDER\", \"PHOS\", \"MAG\", \"LIPASE\", \"AMYLASE\", \"TSH\", \"T4\", \"T3\", \"CRP\", \"SED\"    Anesthesia Plan    ASA Status:  2       Anesthesia Type: General.     - Airway: ETT              Consents    Anesthesia Plan(s) and associated risks, benefits, and realistic alternatives discussed. Questions answered and patient/representative(s) expressed understanding.     - Discussed: Risks, Benefits and Alternatives for the PROCEDURE were discussed     - Discussed with:  Patient      - Extended Intubation/Ventilatory Support Discussed: No.      - Patient is DNR/DNI Status: No     Use of blood products discussed: No .     Postoperative Care    Pain management: Multi-modal analgesia.   PONV prophylaxis: Ondansetron (or other 5HT-3), Dexamethasone or Solumedrol, Scopolamine patch     Comments:               Ni Yap MD    I have reviewed the pertinent notes and labs in the chart from the past 30 days and (re)examined the patient.  Any updates or changes from those notes are reflected in this note.                  "

## 2024-04-30 NOTE — ANESTHESIA PROCEDURE NOTES
Airway       Patient location during procedure: OR       Procedure Start/Stop Times: 4/30/2024 10:28 AM  Staff -        CRNA: Brandee Staples APRN CRNA       Performed By: CRNAIndications and Patient Condition       Indications for airway management: ten-procedural       Induction type:intravenous       Mask difficulty assessment: 1 - vent by mask    Final Airway Details       Final airway type: endotracheal airway       Successful airway: ETT - single  Endotracheal Airway Details        ETT size (mm): 7.5       Cuffed: yes       Successful intubation technique: direct laryngoscopy       DL Blade Type: MAC 3       Grade View of Cords: 1       Adjucts: stylet       Position: Right       Measured from: gums/teeth       Secured at (cm): 21       Bite block used: None    Post intubation assessment        Placement verified by: capnometry, equal breath sounds and chest rise        Number of attempts at approach: 1       Number of other approaches attempted: 0       Secured with: tape       Ease of procedure: easy       Dentition: Intact       Dental guard used and removed. Dental Guard Type: Standard White.    Medication(s) Administered   Medication Administration Time: 4/30/2024 10:28 AM

## 2024-04-30 NOTE — ANESTHESIA POSTPROCEDURE EVALUATION
Patient: Marilyn Mistry    Procedure: Procedure(s):  ROBOTIC ASSISTED TOTAL LAPAROSCOPIC HYSTERECTOMY, BILATERAL SALPINGO-OOPHORECTOMY,OMENTECTOMY, APPENDECTOMY, LYSIS OF ADHESIONS, BILATERAL PELVIC AND PARA-AORTIC LYMPHANDENECTOMY, LARGE BOWEL OVERSEW AND PROCTOSCOPY, PELVIC WASHINGS       Anesthesia Type:  General    Note:  Disposition: Outpatient   Postop Pain Control: Uneventful            Sign Out: Well controlled pain   PONV: No   Neuro/Psych: Uneventful            Sign Out: Acceptable/Baseline neuro status   Airway/Respiratory: Uneventful            Sign Out: Acceptable/Baseline resp. status   CV/Hemodynamics: Uneventful            Sign Out: Acceptable CV status; No obvious hypovolemia; No obvious fluid overload   Other NRE: NONE   DID A NON-ROUTINE EVENT OCCUR? No           Last vitals:  Vitals Value Taken Time   /58 04/30/24 1700   Temp 36.8  C (98.3  F) 04/30/24 1700   Pulse 98 04/30/24 1704   Resp 28 04/30/24 1701   SpO2 99 % 04/30/24 1704   Vitals shown include unfiled device data.    Electronically Signed By: Edwin Andrade MD  April 30, 2024  5:27 PM

## 2024-04-30 NOTE — ANESTHESIA CARE TRANSFER NOTE
Patient: Marilyn Mistry    Procedure: Procedure(s):  ROBOTIC ASSISTED TOTAL LAPAROSCOPIC HYSTERECTOMY, BILATERAL SALPINGO-OOPHORECTOMY,OMENTECTOMY, APPENDECTOMY, LYSIS OF ADHESIONS, BILATERAL PELVIC AND PARA-AORTIC LYMPHANDENECTOMY, LARGE BOWEL OVERSEW AND PROCTOSCOPY, PELVIC WASHINGS       Diagnosis: Pelvic mass [R19.00]  Diagnosis Additional Information: No value filed.    Anesthesia Type:   General     Note:      Level of Consciousness: drowsy  Oxygen Supplementation: face mask  Level of Supplemental Oxygen (L/min / FiO2): 6  Independent Airway: airway patency satisfactory and stable  Dentition: dentition changed      Patient transferred to: PACU    Handoff Report: Identifed the Patient, Identified the Reponsible Provider, Reviewed the pertinent medical history, Discussed the surgical course, Reviewed Intra-OP anesthesia mangement and issues during anesthesia, Set expectations for post-procedure period and Allowed opportunity for questions and acknowledgement of understanding    Vitals:  Vitals Value Taken Time   /64 04/30/24 1415   Temp 36  C (96.8  F) 04/30/24 1415   Pulse 77 04/30/24 1421   Resp 19 04/30/24 1421   SpO2 99 % 04/30/24 1421   Vitals shown include unfiled device data.    Electronically Signed By: RANJAN Solano CRNA  April 30, 2024  2:22 PM

## 2024-04-30 NOTE — ANESTHESIA POSTPROCEDURE EVALUATION
Patient: Marilyn Mistry    Procedure: Procedure(s):  ROBOTIC ASSISTED TOTAL LAPAROSCOPIC HYSTERECTOMY, BILATERAL SALPINGO-OOPHORECTOMY,OMENTECTOMY, APPENDECTOMY, LYSIS OF ADHESIONS, BILATERAL PELVIC AND PARA-AORTIC LYMPHANDENECTOMY, LARGE BOWEL OVERSEW AND PROCTOSCOPY, PELVIC WASHINGS       Anesthesia Type:  General    Note:  Disposition: Outpatient   Postop Pain Control: Uneventful            Sign Out: Well controlled pain   PONV: No   Neuro/Psych: Uneventful            Sign Out: Acceptable/Baseline neuro status   Airway/Respiratory: Uneventful            Sign Out: Acceptable/Baseline resp. status   CV/Hemodynamics: Uneventful            Sign Out: Acceptable CV status; No obvious hypovolemia; No obvious fluid overload   Other NRE: NONE   DID A NON-ROUTINE EVENT OCCUR? No       Last vitals:  Vitals Value Taken Time   /58 04/30/24 1700   Temp 36.8  C (98.3  F) 04/30/24 1700   Pulse 98 04/30/24 1704   Resp 28 04/30/24 1701   SpO2 99 % 04/30/24 1704   Vitals shown include unfiled device data.    Electronically Signed By: Rambo Alarcon MD  April 30, 2024  5:09 PM

## 2024-04-30 NOTE — PROCEDURES
DATE OF SERVICE:    4/30/2024      SURGEON:    Ashley Augustine MD    ASSISTANT:   Roselyn Pickard PA-C     PREOPERATIVE DIAGNOSIS:   10.8 cm solid, complex right ovarian mass  Elevated Ca1 25 tumor marker  Peptic ulcer disease  Hemorrhagic gastric ulcer  4.9 cm inguinal mass    POSTOPERATIVE DIAGNOSIS:   Right ovarian adenocarcinoma    PROCEDURE:  Diagnostic laparoscopy, robotic assisted total laparoscopic hysterectomy, bilateral salpingo-oophorectomy, lysis of adhesions, omentectomy, bilateral pelvic and para-aortic lymphadenectomy, large bowel oversew, rigid proctoscopy     ANESTHESIA:    General endotracheal      COMPLICATIONS:  None.     ESTIMATED BLOOD LOSS :   30 mL    IV FLUIDS:    1000 ML LR    URINE OUTPUT:   1200 mL    FINDINGS:  Normal peritoneal cavity. Normal bilateral diaphragms and liver capsule. Normal appearing stomach and omentum without nodularity. The bowel, intestines were both within normal limits. The appendix was grossly within normal limits.  The right ovary was solid and quite of normal-appearing.  It measured at least 10 cm.  It was adherent to the anterior sigmoid colon in addition to the right posterior vaginal wall and right uterosacral ligament.  Neither ureter was encased within the associated mass.  Rigid proctoscopy failed to reveal any evidence of suture through the mucosa of the sigmoid colon.  Frozen section analysis revealed an adenocarcinoma, possibly mucinous versus clear cell of uncertain etiology whether primary ovarian versus metastatic.  Signet ring cells were not visualized.  At the completion of surgery, Marilyn would be completely resected.       PROCEDURE IN DETAIL:  Marilyn Mistry is a very pleasant 58-year-old female with a history of a bleeding gastric pyloric ulcer dating back to August 2023 were normal CT scan images of the abdomen and pelvis failed to identify any evidence of a pelvic mass.  She underwent an EGD x 2 in August and September 2023 which  demonstrated reactive gastropathy with focal active inflammation.  She was seen in consultation for a right inguinal 4.9 cm mass which had been present for several years and on abdominal ultrasound this was identified to be consistent with a solid-appearing subcutaneous mass of the right lower quadrant without the appearance typical of the lymph node.  A lipoma was felt to be possible.  In April 2024 Marilyn presented to her primary care provider with complaints of low back pain and abdominal bloating.  She underwent a CT scan which identified a large heterogenous right adnexal mass measuring approximately 10 cm of uncertain etiology.  A pelvic ultrasound was performed measuring the mass at 10.8 cm.  A Ca1 25 tumor marker was drawn and was elevated at 246 units/mL.  The patient was counseled as the recommended procedure.  The risks, benefits and alternatives were discussed in detail.  She subsequently underwent a PET/CT which identified FDG avidity within the right adnexal mass in addition to a solitary lesion within the right upper quadrant of uncertain etiology.  There was no other evidence for additional FDG avidity within the abdomen and pelvis.  She also underwent a repeat EGD which failed to identify any evidence of a primary lesion.  Her last colonoscopy was in 2016 and she was provided a 10-year follow-up at that time.  The consent was signed in the preoperative area.  The patient was subsequently brought to the operating room for a hysterectomy with bilateral salpingo-oophorectomy and possible staging depending on intraoperative frozen section analysis. General. anesthesia was found to be adequate. She was prepared and draped in the normal sterile fashion in lithotomy positioning. Her arms were padded and tucked at her sides.  A briefing and timeout were performed and the staff in the room were educated as to the planned procedure.     At the start of the case, the martin catheter was anchored within the  urethra and an open sided speculum was placed within the vagina.  The anterior lip of the cervix grasped with the single-toothed tenaculum. The uterine sound was used to cannulate the cervix. A stitch was placed across the anterior lip of the cervix, and a medium V-care was advanced into the endocervical canal. The V-care was seated, and attention was turned to the abdomen.      A supra-umbilical incision was planned.  An 8 mm skin incision was created 27 cm cephalad to the pubic symphisis.  A 5 mm 0  optical viewing laparoscope was introduced in the abdomen with guidance of a 5 mm optical viewing trocar and obturator.  Visual confirmation of entrance into the abdomen was confirmed and the intraperitoneal cavity was insufflated to 15 mmHg with CO2 gas.  Additional robotic trochars were mapped out 11 cm directly lateral for the medial trocar sites and with a 15  drop to the 2 lateral trocar sites.  Skin incisions were created for the robotic trochars, and the robotic trochars were placed within the intraperitoneal cavity under direct visualization with the laparoscope.  The patient was placed in steep Trendelenburg and the bowel fell within the upper abdomen.  The assistant port site was mapped out 2 cm cephalad and medial to the ASIS.  This incision was 10 mm, and the 8 AirSeal mm trocar was connected to the air seal insufflation device.  The 5 mm supra-umbilical trocar was replaced for an 8 mm robotic camera trocar. The abdomen was inspected with the findings above. The bowel had fallen freely into the upper abdomen. The robot was then docked and all trochars were cannulated.    Attention was turned to the patient's pelvis.  It was obvious that the centrally located abnormal pelvic mass was arising from the right adnexa.  The right round ligament was grasped and transected with monopolar energy.  The peritoneum was opened in a parallel fashion to the gonadal vessels.  The retroperitoneum was opened and developed in  order to fully visualize the ureter within the posterior leaf the broad ligament.  Subsequently, the right gray space bound by the gonadal vessels, the utero-ovarian pedicle and the ureter was opened and developed.  The gonadal vessels on the right were skeletonized and were subsequently coagulated, sealed and transected.  The pedicle was elevated and the posterior leaf of the broad ligament was dissected anterior to the ureter and an inferior fashion to the right uterosacral ligament.  There was difficulty with passing this area subsequently without issue and attention was turned to the patient's left pelvic sidewall.  The left round ligament was subsequently held on tension and transected with monopolar energy.  The posterior pelvic peritoneum was opened in a parallel fashion of the gonadal vessels in a similar means to the right pelvic dissection.  The retroperitoneal space was opened and developed.  The ureter was identified vermiculate and within the posterior leaf.  The gray space was again opened and developed and the gonadal vessels were skeletonized and were coagulated, sealed and transected.  The posterior leaf of the broad ligament was dissected inferior to the uterosacral ligament on the left.  Attention was then turned anterior.  The vesicouterine peritoneum was opened and developed with monopolar energy.  The pubocervical fascia was dissected inferior.  Subsequently, attention was turned to further dissecting the posterior uterine adhesions to the sigmoid colon.  It was obvious at this point that there were extensive dense adhesions of the posterior cervix and lower uterine segment including the bilateral uterosacral ligament distal ends to the anterior sigmoid colon.  The majority of this dissection proceeded with cold cut isabella enabling a separation for further dissection of the posterior uterine cervix.  This dissection took in excess of 20 minutes and significantly complicated the hysterectomy  portion of the procedure.  The left uterine vessels were coagulated, sealed and transected and attention was turned to the right adnexa.  The utero-ovarian pedicle was opened and developed.  The right uterine vessels were skeletonized.  There was increased difficulty in performing this due to uncertain anatomic change with the adhesions along the posterior right uterosacral ligament.  Therefore, the utero-ovarian pedicle was coagulated, sealed and transected to separate the right adnexa from the uterus itself.  This enabled the ability to divert the uterus directly anterior and further dissected the posterior cul-de-sac.  This was performed primarily with the cold cut isabella.  Again this significantly increase the complexity of the procedure.  The anterior rectosigmoid colon was dissected away from the posterior vaginal vault and posterior cervix.  Once this was sufficient, the colpotomy was initiated on the left.  The uterine vessels on the right were coagulated, sealed and transected.  The colpotomy was then circumferentially completed.  The uterus, cervix, left fallopian tube and ovary were delivered transvaginally.  The specimen was sent for routine processing.  The right ovary and fallopian tube were elevated and dissected away from the anterior sigmoid colon with the cold cut isabella.  Following, the right ovarian mass was placed within a 25 cm Endo Catch bag and delivered transvaginally.  The mass was entirely morcellated within the bag.  Once this was completely removed, attention was turned to the remainder of the procedure.     The omentectomy was undertaken by grasping the omentum and gently diverting it anterior to visualize the attachments to the transverse colon. The loose attachments were dissected in the direction of the hepatic flexure. The gastroepiploic vessels on the hepatic aspect were coagulated, sealed and transected with the Intuitive surgical vessel sealing device. The lesser sac was entered,  and the loose attachments were dissected in the direction of the splenic flexure. The short gastrics were visualized, and the dissection proceeded just below the transverse colon. The omentum was held to the hepatic flexure, and the dissection proceeded in the direction of the spleen. The gastro-epiploic vessels on the splenic aspect were sealed and transected. The omentum was then delivered transvaginally and sent for routine processing.  At this point, frozen section analysis returned as adenocarcinoma, cannot rule out ovarian versus metastatic primary.  Histopathologic subtyping was consistent with possible mucinous versus clear cell adenocarcinoma.      Attention was then turned to the lymph node dissection of the procedure.  The pelvic lymph node dissection was then undertaken. Attention was turned to the right pelvic sidewall.  The obliterated right umbilical ligament was diverted medial, the paravesical space was developed.  The ureter was retracted over the common iliac artery. The chapin tissue was dissected from the common iliac vessel on the right beneath the ureter to the inferior border of the circumflex iliac vein. The lateral border was the genitofemoral nerve, which was preserved. The chapin tissue was then dissected inferior to the obturator space. The inferior border was the obturator nerve and the medial border was the ureter. The chapin bundle was dissected and delivered directly with a grasper through the right lower quadrant assistant trocar site. Attention was turned to the left pelvic chapin dissection. This was undertaken in a similar fashion to the right. The chapin tissue was dissected with the same boundaries.  The left pelvic lymph nodes were delivered with a grasper through the right lower quadrant assistant trocar site.      Lymph nodes were the para-aortic lymph node dissection was then initiated by opening the peritoneal surface over the right common iliac vein. The loose areolar dividing  tissue between the overlying right colon mesentery and the underlying chapin tissue was dissected to the right psoas muscle. The ureter was displaced anteriorly with the mesentery and held in place with the 4th robotic arm. The lymph nodes were dissected from the retroperitoneal duodenum inferior to the right common iliac vein. Intervening vasculature was coagulated with the bipolar forceps. The chapin tissue was collected and delivered directly with a grasper through the assistant port site. The left para-aortic dissection was undertaken in a similar fashion by opening the peritoneum overlying the left common iliac artery. The underlying chapin tissue was  from the overlying colonic mesentery to the lateral border of the psoas muscle. The ureter was held anteriorly.  Subsequently the left para-aortic lymph nodes were dissected from the NIMESH to the left common iliac artery from the prior dissection.  The left perirectal lymph nodes were then also delivered directly with a grasper through the right lower quadrant assistant trocar site.    Attention was turned to the appendectomy as the histopathologic subtype was potentially consistent with a mucinous adenocarcinoma.  The appendix was visualized, the mesenteric aspect of the appendix was dissected with monopolar energy, and the entire mesenteric serosa dissected off the cecum. Following, the appendiceal artery was coagulated, and transected with the vessel sealing device. The base of the appendix was stapled with the robotic DAREK-55 stapler with a blue load. The appendix was delivered through the right lower quadrant assistant trocar site and sent for routine analysis.      All surgical sites were irrigated and found to be hemostatic. The hemostatic agent, snow, was placed within all 4 chapin basins.  There was a small amount of bleeding from the appendiceal artery which was controlled with monopolar energy.  The Ortho arm of the robot port site was then  re-approximated utilizing the Jagjit-Marito closure device with a free-tie of O-vicryl. All robotic instruments were then removed from the patient's abdomen, and the robot was un-docked. The skin incisions were re-approximated with a 3-0 subcuticular stitch followed by an overlying layer of dermabond.      The vagina was inspected and found to be hemostatic. All sponges, needles and instrument counts were correct x2. She was taken to the recovery room in a stable condition.      Roselyn Pickard PA-C assisted me throughout the case and was paramount in the completion of all vital portions.  She assisted with retraction, adequate visualization, placement of the uterine manipulator, suctioning, delivery of all the specimens and closure.     Ashley Augustine MD  [facility]  Gynecologic Oncologist  [facility]  [phone]

## 2024-04-30 NOTE — PHARMACY-ADMISSION MEDICATION HISTORY
Pharmacist Admission Medication History    Admission medication history is complete. The information provided in this note is only as accurate as the sources available at the time of the update.    Information Source(s): Patient via in-person    Pertinent Information: Patient stated her only two current medications are Omeprazole 20 mg daily in the morning and Sumatriptan 50 mg tablet prn for migraine headache. Patient stated she is allergic to all NSAID products. Allergy list updated.     Changes made to PTA medication list:  Added: Omeprazole  Deleted: Amoxicillin, Clobetasol, Erythromycin, Estradiol, Gabapentin  Changed: None    Allergies reviewed with patient and updates made in EHR: yes    Medication History Completed By: MERYL URIARTE RPH 4/30/2024 9:56 AM    PTA Med List   Medication Sig Last Dose    omeprazole 20 MG tablet Take 20 mg by mouth daily 4/30/2024 at 0630    SUMAtriptan (IMITREX) 50 MG tablet Take 50 mg by mouth at onset of headache for migraine Past Month at prn

## 2024-05-03 LAB
PATH REPORT.COMMENTS IMP SPEC: ABNORMAL
PATH REPORT.COMMENTS IMP SPEC: YES
PATH REPORT.FINAL DX SPEC: ABNORMAL
PATH REPORT.GROSS SPEC: ABNORMAL
PATH REPORT.MICROSCOPIC SPEC OTHER STN: ABNORMAL

## 2024-05-19 ENCOUNTER — HEALTH MAINTENANCE LETTER (OUTPATIENT)
Age: 59
End: 2024-05-19

## 2024-06-02 LAB
PATH REPORT.ADDENDUM SPEC: ABNORMAL
PATH REPORT.COMMENTS IMP SPEC: ABNORMAL
PATH REPORT.COMMENTS IMP SPEC: YES
PATH REPORT.FINAL DX SPEC: ABNORMAL
PATH REPORT.GROSS SPEC: ABNORMAL
PATH REPORT.INTRAOP OBS SPEC DOC: ABNORMAL
PATH REPORT.MICROSCOPIC SPEC OTHER STN: ABNORMAL
PATH REPORT.RELEVANT HX SPEC: ABNORMAL
PATHOLOGY SYNOPTIC REPORT: ABNORMAL
PHOTO IMAGE: ABNORMAL

## 2024-06-03 ENCOUNTER — MEDICAL CORRESPONDENCE (OUTPATIENT)
Dept: HEALTH INFORMATION MANAGEMENT | Facility: CLINIC | Age: 59
End: 2024-06-03
Payer: COMMERCIAL

## 2024-06-05 NOTE — PROGRESS NOTES
"  Interventional Radiology - Pre-Procedure Note:  [facility]  06/07/2024      Procedure Requested: port placement  Requested by: Dr Piedad Augustine    History and Physical Reviewed: H&P documented within 30 days (by  Dr Piedad Augustine on 5/23/24).  I have personally reviewed the patient's medical history and have updated the medical record as necessary.    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 Followed by Oncology who recommends adjuvant chemotherapy.    Patient presenting for port placement to facilitate treatments.    NPO: since midnight  ANTICOAGULANTS: none  ANTIBIOTICS: Ancef 2g ordered for procedure    ALLERGIES  Allergies   Allergen Reactions    Ibuprofen Other (See Comments)     Bleeding ulcer    Nsaids          LABS:  No results found for: \"INR\"   Hemoglobin   Date Value Ref Range Status   04/30/2024 14.2 11.7 - 15.7 g/dL Final     No results found for: \"PLT\"  No results found for: \"CR\"  No results found for: \"POTASSIUM\"      EXAM:  BP 99/57 (BP Location: Right arm)   Pulse 81   Temp 97.8  F (36.6  C) (Oral)   Resp 18   SpO2 96%   General: Stable. In no acute distress.    Neuro: Alert and oriented x 3. No focal deficits.  Psych: Appropriate mood and affect. Linear/coherent thought process.   Resp: Normal respirations. Lungs clear to auscultation bilaterally.  Cardio: S1S2, regular rate and rhythm, without murmur, clicks or rubs  Abdomen: Soft, non-distended, non-tender.   Skin: Warm and dry. Without excoriations, ecchymosis, erythema, lesions or open sores on upper chest.      Pre-Sedation Assessment:  Mallampati Airway Classification:  II - Faucial pillars and soft palate may be seen, but uvula is masked by the base of the tongue  Previous reaction to anesthesia/sedation:  No  Sedation plan based on assessment: Moderate (conscious) sedation  ASA Classification: Class 2 - MILD SYSTEMIC DISEASE, NO " ACUTE PROBLEMS, NO FUNCTIONAL LIMITATIONS.   Code Status: Full Code intra procedure, per discussion with patient.         ASSESSMENT/PLAN:   Procedure discussion with patient and physical exam yielded no contraindications to laterality for port placement.    Port placement with sedation, laterality per proceduralist discretion.    Procedural education reviewed with patient/family in detail including, but not limited to risks, benefits and alternatives with understanding verbalized by patient/family.    Total time spent on the date of the encounter: 45 minutes.    RANJAN TAVERAS CNP on 6/7/2024 at 7:57 AM   Interventional Radiology

## 2024-06-07 ENCOUNTER — HOSPITAL ENCOUNTER (OUTPATIENT)
Dept: INTERVENTIONAL RADIOLOGY/VASCULAR | Facility: HOSPITAL | Age: 59
Discharge: HOME | End: 2024-06-07
Attending: OBSTETRICS & GYNECOLOGY | Admitting: RADIOLOGY
Payer: COMMERCIAL

## 2024-06-07 VITALS
HEART RATE: 70 BPM | TEMPERATURE: 97.5 F | RESPIRATION RATE: 15 BRPM | DIASTOLIC BLOOD PRESSURE: 59 MMHG | OXYGEN SATURATION: 99 % | SYSTOLIC BLOOD PRESSURE: 102 MMHG

## 2024-06-07 DIAGNOSIS — C56.9 YOLK SAC TUMOR IN FEMALE (H): ICD-10-CM

## 2024-06-07 PROCEDURE — 36561 INSERT TUNNELED CV CATH: CPT

## 2024-06-07 PROCEDURE — 250N000011 HC RX IP 250 OP 636: Performed by: NURSE PRACTITIONER

## 2024-06-07 PROCEDURE — C1769 GUIDE WIRE: HCPCS

## 2024-06-07 PROCEDURE — 250N000009 HC RX 250: Performed by: NURSE PRACTITIONER

## 2024-06-07 PROCEDURE — C1788 PORT, INDWELLING, IMP: HCPCS

## 2024-06-07 PROCEDURE — 99152 MOD SED SAME PHYS/QHP 5/>YRS: CPT

## 2024-06-07 PROCEDURE — 272N000500 HC NEEDLE CR2

## 2024-06-07 PROCEDURE — 258N000003 HC RX IP 258 OP 636: Mod: JZ | Performed by: NURSE PRACTITIONER

## 2024-06-07 PROCEDURE — 250N000011 HC RX IP 250 OP 636: Mod: JZ | Performed by: NURSE PRACTITIONER

## 2024-06-07 PROCEDURE — 250N000011 HC RX IP 250 OP 636: Mod: JZ | Performed by: RADIOLOGY

## 2024-06-07 RX ORDER — FLUMAZENIL 0.1 MG/ML
0.2 INJECTION, SOLUTION INTRAVENOUS
Status: DISCONTINUED | OUTPATIENT
Start: 2024-06-07 | End: 2024-06-08 | Stop reason: HOSPADM

## 2024-06-07 RX ORDER — HEPARIN SODIUM (PORCINE) LOCK FLUSH IV SOLN 100 UNIT/ML 100 UNIT/ML
5-10 SOLUTION INTRAVENOUS
Status: CANCELLED | OUTPATIENT
Start: 2024-06-07

## 2024-06-07 RX ORDER — HEPARIN SODIUM,PORCINE 10 UNIT/ML
5-10 VIAL (ML) INTRAVENOUS
Status: CANCELLED | OUTPATIENT
Start: 2024-06-07

## 2024-06-07 RX ORDER — NALOXONE HYDROCHLORIDE 0.4 MG/ML
0.2 INJECTION, SOLUTION INTRAMUSCULAR; INTRAVENOUS; SUBCUTANEOUS
Status: DISCONTINUED | OUTPATIENT
Start: 2024-06-07 | End: 2024-06-08 | Stop reason: HOSPADM

## 2024-06-07 RX ORDER — LIDOCAINE 40 MG/G
CREAM TOPICAL
Status: DISCONTINUED | OUTPATIENT
Start: 2024-06-07 | End: 2024-06-08 | Stop reason: HOSPADM

## 2024-06-07 RX ORDER — SODIUM CHLORIDE 9 MG/ML
INJECTION, SOLUTION INTRAVENOUS CONTINUOUS
Status: DISCONTINUED | OUTPATIENT
Start: 2024-06-07 | End: 2024-06-08 | Stop reason: HOSPADM

## 2024-06-07 RX ORDER — NALOXONE HYDROCHLORIDE 0.4 MG/ML
0.4 INJECTION, SOLUTION INTRAMUSCULAR; INTRAVENOUS; SUBCUTANEOUS
Status: DISCONTINUED | OUTPATIENT
Start: 2024-06-07 | End: 2024-06-08 | Stop reason: HOSPADM

## 2024-06-07 RX ORDER — CEFAZOLIN SODIUM/WATER 2 G/20 ML
2 SYRINGE (ML) INTRAVENOUS
Status: COMPLETED | OUTPATIENT
Start: 2024-06-07 | End: 2024-06-07

## 2024-06-07 RX ORDER — HEPARIN SODIUM (PORCINE) LOCK FLUSH IV SOLN 100 UNIT/ML 100 UNIT/ML
500 SOLUTION INTRAVENOUS ONCE
Status: COMPLETED | OUTPATIENT
Start: 2024-06-07 | End: 2024-06-07

## 2024-06-07 RX ORDER — ONDANSETRON 2 MG/ML
4 INJECTION INTRAMUSCULAR; INTRAVENOUS
Status: DISCONTINUED | OUTPATIENT
Start: 2024-06-07 | End: 2024-06-08 | Stop reason: HOSPADM

## 2024-06-07 RX ORDER — FENTANYL CITRATE 50 UG/ML
25-50 INJECTION, SOLUTION INTRAMUSCULAR; INTRAVENOUS EVERY 5 MIN PRN
Status: DISCONTINUED | OUTPATIENT
Start: 2024-06-07 | End: 2024-06-08 | Stop reason: HOSPADM

## 2024-06-07 RX ORDER — MULTIPLE VITAMINS W/ MINERALS TAB 9MG-400MCG
1 TAB ORAL DAILY
COMMUNITY

## 2024-06-07 RX ORDER — HEPARIN SODIUM,PORCINE 10 UNIT/ML
5-10 VIAL (ML) INTRAVENOUS EVERY 24 HOURS
Status: CANCELLED | OUTPATIENT
Start: 2024-06-07

## 2024-06-07 RX ADMIN — SODIUM CHLORIDE: 9 INJECTION, SOLUTION INTRAVENOUS at 07:30

## 2024-06-07 RX ADMIN — Medication 2 G: at 08:09

## 2024-06-07 RX ADMIN — MIDAZOLAM HYDROCHLORIDE 1 MG: 1 INJECTION, SOLUTION INTRAMUSCULAR; INTRAVENOUS at 08:16

## 2024-06-07 RX ADMIN — FENTANYL CITRATE 50 MCG: 50 INJECTION, SOLUTION INTRAMUSCULAR; INTRAVENOUS at 08:13

## 2024-06-07 RX ADMIN — MIDAZOLAM HYDROCHLORIDE 2 MG: 1 INJECTION, SOLUTION INTRAMUSCULAR; INTRAVENOUS at 08:11

## 2024-06-07 RX ADMIN — Medication 500 UNITS: at 08:29

## 2024-06-07 RX ADMIN — FENTANYL CITRATE 50 MCG: 50 INJECTION, SOLUTION INTRAMUSCULAR; INTRAVENOUS at 08:18

## 2024-06-07 RX ADMIN — SODIUM CHLORIDE 500 ML: 9 INJECTION, SOLUTION INTRAVENOUS at 08:30

## 2024-06-07 RX ADMIN — LIDOCAINE HYDROCHLORIDE 10 ML: 10 INJECTION, SOLUTION INFILTRATION; PERINEURAL at 08:20

## 2024-06-07 NOTE — DISCHARGE INSTRUCTIONS
Port Placement Procedure Discharge Instructions:  You had a port placed. A port is a small medical device that is placed under the skin and is connected to a vein with a catheter (thin, flexible tube). Ports can be used to administer IV medications (including chemotherapy), fluids or blood products or for blood lab draws. Please follow the below instructions after your procedure:    Care Instructions:  - If you received sedation for your procedure, do not drive or operate heavy machinery for the rest of the day.  - You may shower beginning tomorrow (post procedure day #1). Do not scrub site until well healed; pat dry gently with a towel.  - You likely have skin adhesive over your port site. Skin adhesive works like a bandage to keep the site covered and protected. Do not use antibiotic ointment or creams/lotions over adhesive as it can break it down. The skin adhesive will peel off on its own (typically in 5-14 days).  - Avoid submerging the port site under water (ex: tub baths, Jacuzzis, lakes, hot tubs and pools) for 10 days or until your site is well healed.  - You may have some discomfort, minimal swelling, redness and/or bruising at your port site/procedure site. You may take over the counter pain medication for discomfort (follow the package directions) or apply an ice pack wrapped in a towel over the site (rotating 20 minutes with ice pack on and 20 minutes with ice pack off) for comfort as needed. It can take several days for these to resolve.  - Avoid heavy lifting (greater than 10 pounds) and strenuous activities for 2 days following your procedure.   - If you experience significant bleeding at site, apply pressure with hands above the clavicle bone, sit upright and seek immediate medical assistance.  - Ports need to be flushed approximately every 4-6 weeks, if not being used more frequently. Follow up with the provider who ordered your port placement for further instructions for this.    Seek medical  evaluation or contact [facility] RN Line at [phone] if you experience the following:  - Uncontrolled bleeding from port site  - Fever (greater than 101 F (38.3C))  - Purulent (yellow/green/foul smelling) drainage from port insertion site  - Increasing pain at port site  - Increasing redness at port site

## 2024-06-07 NOTE — PROCEDURES
Interventional Radiology Post-Procedure Note     Patient name:  Marilyn Mistry  MRN:  [id number]   Date:  6/7/2024     Procedure(s): Right chest port placement    Attending:  Susan    Sedation:  Moderate sedation    Pre/Post Procedure Diagnosis: Ovarian yolk sac tumor    Drains/Lines:  SmartPort    Specimen(s):  None    Estimated Blood Loss:  Minimal    Complications:  None     Findings:  Uneventful procedure, please see dictation in PACS or under the Imaging tab in EPIC for detailed procedure note.    Plan:  Port ready for immediate use. Patient may discharge in 1 hour post-procedure if criteria met.      Clay Davis MD  Vascular & Interventional Radiology  [facility]  6/7/2024  8:35 AM

## 2024-06-07 NOTE — IR NOTE
Patient Name: Marilyn Mistry  Medical Record Number: [id number]  Today's Date: 6/7/2024    Procedure: Port placement  Proceduralist: Dr Davis    Procedure Start: 0816  Procedure end: 0833  Sedation medications administered: 3 mg midazolam and 100 mcg fentanyl   Sedation time: 17 minutes

## 2024-06-07 NOTE — PRE-PROCEDURE
GENERAL PRE-PROCEDURE:   Procedure:  Port placement  Date/Time:  6/7/2024 7:57 AM    Written consent obtained?: Yes    Risks and benefits: Risks, benefits and alternatives were discussed    Consent given by:  Patient  Patient states understanding of procedure being performed: Yes    Patient's understanding of procedure matches consent: Yes    Procedure consent matches procedure scheduled: Yes    Expected level of sedation:  Moderate  Appropriately NPO:  Yes  ASA Class:  2  Mallampati  :  Grade 2- soft palate, base of uvula, tonsillar pillars, and portion of posterior pharyngeal wall visible  Lungs:  Lungs clear with good breath sounds bilaterally  Heart:  Normal heart sounds and rate  History & Physical reviewed:  History and physical reviewed and no updates needed  Statement of review:  I have reviewed the lab findings, diagnostic data, medications, and the plan for sedation

## 2024-06-12 ENCOUNTER — TRANSCRIBE ORDERS (OUTPATIENT)
Dept: OTHER | Age: 59
End: 2024-06-12

## 2024-06-12 DIAGNOSIS — C56.9 YOLK SAC TUMOR IN FEMALE (H): Primary | ICD-10-CM

## 2024-06-26 RX ORDER — ALBUTEROL SULFATE 0.83 MG/ML
2.5 SOLUTION RESPIRATORY (INHALATION) ONCE
Status: COMPLETED | OUTPATIENT
Start: 2024-06-27 | End: 2024-06-27

## 2024-06-27 ENCOUNTER — HOSPITAL ENCOUNTER (OUTPATIENT)
Dept: RESPIRATORY THERAPY | Facility: CLINIC | Age: 59
Discharge: HOME | End: 2024-06-27
Attending: OBSTETRICS & GYNECOLOGY | Admitting: OBSTETRICS & GYNECOLOGY
Payer: COMMERCIAL

## 2024-06-27 DIAGNOSIS — C56.9 YOLK SAC TUMOR IN FEMALE (H): Primary | ICD-10-CM

## 2024-06-27 LAB — HGB BLD-MCNC: 10.6 G/DL (ref 11.7–15.7)

## 2024-06-27 PROCEDURE — 999N000157 HC STATISTIC RCP TIME EA 10 MIN

## 2024-06-27 PROCEDURE — 94729 DIFFUSING CAPACITY: CPT

## 2024-06-27 PROCEDURE — 94060 EVALUATION OF WHEEZING: CPT | Mod: 26 | Performed by: INTERNAL MEDICINE

## 2024-06-27 PROCEDURE — 94726 PLETHYSMOGRAPHY LUNG VOLUMES: CPT | Mod: 26 | Performed by: INTERNAL MEDICINE

## 2024-06-27 PROCEDURE — 94729 DIFFUSING CAPACITY: CPT | Mod: 26 | Performed by: INTERNAL MEDICINE

## 2024-06-27 PROCEDURE — 94060 EVALUATION OF WHEEZING: CPT

## 2024-06-27 PROCEDURE — 250N000009 HC RX 250

## 2024-06-27 PROCEDURE — 36415 COLL VENOUS BLD VENIPUNCTURE: CPT

## 2024-06-27 PROCEDURE — 85018 HEMOGLOBIN: CPT

## 2024-06-27 PROCEDURE — 94726 PLETHYSMOGRAPHY LUNG VOLUMES: CPT

## 2024-06-27 RX ADMIN — ALBUTEROL SULFATE 2.5 MG: 2.5 SOLUTION RESPIRATORY (INHALATION) at 08:51

## 2024-06-27 NOTE — PROGRESS NOTES
RESPIRATORY CARE NOTE      Complete PFT. Good patient effort and cooperation.   The results of testing meet ATS critieria for acceptability & repeatability except for DLCO. Patient was unable to inhale to >90% of Vital Capacity for DLCO testing on all breaths except for 1 breathing maneuver. Patient was given 2.5 mg albuterol via neb.   Patient left PFT lab in no distress.    Maine Lopez, RT  6/27/2024

## 2024-07-02 LAB
DLCOCOR-%PRED-PRE: 75 %
DLCOCOR-PRE: 16.28 ML/MIN/MMHG
DLCOUNC-%PRED-PRE: 68 %
DLCOUNC-PRE: 14.69 ML/MIN/MMHG
DLCOUNC-PRED: 21.46 ML/MIN/MMHG
ERV-%PRED-PRE: 69 %
ERV-PRE: 0.86 L
ERV-PRED: 1.23 L
EXPTIME-PRE: 4.42 SEC
FEF2575-%PRED-POST: 127 %
FEF2575-%PRED-PRE: 118 %
FEF2575-POST: 3.04 L/SEC
FEF2575-PRE: 2.82 L/SEC
FEF2575-PRED: 2.39 L/SEC
FEFMAX-%PRED-PRE: 87 %
FEFMAX-PRE: 6.02 L/SEC
FEFMAX-PRED: 6.87 L/SEC
FEV1-%PRED-PRE: 89 %
FEV1-PRE: 2.34 L
FEV1FEV6-PRE: 85 %
FEV1FEV6-PRED: 81 %
FEV1FVC-PRE: 85 %
FEV1FVC-PRED: 80 %
FEV1SVC-PRE: 101 %
FEV1SVC-PRED: 73 %
FIFMAX-PRE: 4.46 L/SEC
FRCPLETH-%PRED-PRE: 110 %
FRCPLETH-PRE: 3.18 L
FRCPLETH-PRED: 2.87 L
FVC-%PRED-PRE: 83 %
FVC-PRE: 2.76 L
FVC-PRED: 3.3 L
IC-%PRED-PRE: 59 %
IC-PRE: 1.47 L
IC-PRED: 2.46 L
RVPLETH-%PRED-PRE: 115 %
RVPLETH-PRE: 2.32 L
RVPLETH-PRED: 2.01 L
TLCPLETH-%PRED-PRE: 85 %
TLCPLETH-PRE: 4.65 L
TLCPLETH-PRED: 5.44 L
VA-%PRED-PRE: 86 %
VA-PRE: 4.53 L
VC-%PRED-PRE: 64 %
VC-PRE: 2.33 L
VC-PRED: 3.59 L

## 2024-09-05 ENCOUNTER — TRANSFERRED RECORDS (OUTPATIENT)
Dept: HEALTH INFORMATION MANAGEMENT | Facility: CLINIC | Age: 59
End: 2024-09-05

## 2024-09-05 ENCOUNTER — MEDICAL CORRESPONDENCE (OUTPATIENT)
Dept: HEALTH INFORMATION MANAGEMENT | Facility: CLINIC | Age: 59
End: 2024-09-05

## 2025-06-08 ENCOUNTER — HEALTH MAINTENANCE LETTER (OUTPATIENT)
Age: 60
End: 2025-06-08

## (undated) DEVICE — JELLY LUBRICATING SURGILUBE 2OZ TUBE

## (undated) DEVICE — CATH FOLEY 16FR 5ML LUBRICATH LATEX 0165L16

## (undated) DEVICE — SUTURE VICRYL+ 0 27IN CT-1 UND VCP260H

## (undated) DEVICE — ANTIFOG SOLUTION SEE SHARP 150M TROCAR SWABS 30978

## (undated) DEVICE — STPL DAVINCI SUREFORM 60MM STR 480460

## (undated) DEVICE — PREP DYNA-HEX 4% CHG SCRUB 4OZ BOTTLE MDS098710

## (undated) DEVICE — BLADE KNIFE SURG 11 371111

## (undated) DEVICE — SU STRATAFIX PDS 0 CT-2 30CM SXPP1B405

## (undated) DEVICE — MAT FLOOR SURGICAL 40X38 0702140238

## (undated) DEVICE — SUCTION STRYKERFLOW II 250-070-500

## (undated) DEVICE — GLOVE UNDER INDICATOR PI SZ 7.0 LF 41670

## (undated) DEVICE — SURGICEL ABSORBABLE HEMOSTAT SNOW 4"X4" 2083

## (undated) DEVICE — DAVINCI XI OBTURATOR BLADELESS 8MM 470359

## (undated) DEVICE — CUSTOM PACK DA VINCI GYN SMA5BDVHEA

## (undated) DEVICE — ENDO OBTURATOR ACCESS PORT BLADELESS 8X100MM IAS8-100LP

## (undated) DEVICE — PROTECTOR ARM STANDARD ONE STEP

## (undated) DEVICE — SUCTION MANIFOLD NEPTUNE 2 SYS 1 PORT 702-025-000

## (undated) DEVICE — PAD POS XL 1X20X40IN PINK PIGAZZI

## (undated) DEVICE — SYR 50ML SLIP TIP W/O NDL 309654

## (undated) DEVICE — DRAPE POUCH INSTRUMENT 3 POCKET 1018L

## (undated) DEVICE — SUTURE VICRYL+ 0 36IN CT-1 UND VCP946H

## (undated) DEVICE — DAVINCI XI DRAPE ARM 470015

## (undated) DEVICE — GLOVE BIOGEL PI ULTRATOUCH G SZ 6.5 42165

## (undated) DEVICE — PREP CHLORAPREP 26ML TINTED HI-LITE ORANGE 930815

## (undated) DEVICE — DAVINCI XI SEAL UNIVERSAL 5-12MM 470500

## (undated) DEVICE — DAVINCI XI HANDPIECE ESU VESSEL SEALER 8MM EXT 480422

## (undated) DEVICE — PACK TRENGUARD 450 PROCEDURAL 2065406

## (undated) DEVICE — SOL WATER IRRIG 1000ML BOTTLE 2F7114

## (undated) DEVICE — SOL NACL 0.9% INJ 1000ML BAG 2B1324X

## (undated) DEVICE — DRAPE IOBAN INCISE 23X17" 6650EZ

## (undated) DEVICE — GLOVE BIOGEL PI ULTRATOUCH G SZ 7.0 42170

## (undated) DEVICE — SUTURE MONOCRYL 3-0 18 PS2 UND MCP497G

## (undated) DEVICE — SYR 10ML FINGER CONTROL W/O NDL 309695

## (undated) DEVICE — TUBING FILTER TRI-LUMEN AIRSEAL ASC-EVAC1

## (undated) DEVICE — DECANTER VIAL 2006S

## (undated) DEVICE — PITCHER STERILE 1000ML  SSK9004A

## (undated) DEVICE — BAG TISSUE RETRIEVAL ANCHOR C4000ML 25MM TRS-TV-25

## (undated) DEVICE — DRAPE LEGGINGS 8421

## (undated) DEVICE — SU VICRYL+ 0 27 UR6 VLT VCP603H

## (undated) DEVICE — DRAPE SLEEVE 599

## (undated) DEVICE — DAVINCI XI DRAPE COLUMN 470341

## (undated) DEVICE — DRAPE SHEET TABLE COVER KC 42301*

## (undated) DEVICE — STPL DAVINCI SUREFORM 60MM RELOAD BLUE 48360B

## (undated) DEVICE — DRAPE U SPLIT 74X120" 29440

## (undated) DEVICE — GOWN IMPERVIOUS BREATHABLE SMART LG 89015

## (undated) DEVICE — RETR ELEV / UTERINE MANIPULATOR V-CARE MED CUP 60-6085-201A

## (undated) DEVICE — NEEDLE HYPO MAGELLAN SAFETY 22GA 1 1/2IN 8881850215

## (undated) DEVICE — DRAPE LAP/CHOLE W/O POUCH 89225

## (undated) DEVICE — TRAP SPECIMAN 5CC-40CC DYND44140

## (undated) DEVICE — SU VICRYL+ 3-0 27IN SH UND VCP416H

## (undated) DEVICE — SU DERMABOND ADVANCED .7ML DNX12

## (undated) DEVICE — LUBRICANT INST ELECTROLUBE EL101

## (undated) DEVICE — DAVINCI HOT SHEARS TIP COVER  400180

## (undated) RX ORDER — BUPIVACAINE HYDROCHLORIDE 5 MG/ML
INJECTION, SOLUTION EPIDURAL; INTRACAUDAL
Status: DISPENSED
Start: 2024-04-30

## (undated) RX ORDER — LIDOCAINE HYDROCHLORIDE 10 MG/ML
INJECTION, SOLUTION INFILTRATION; PERINEURAL
Status: DISPENSED
Start: 2024-06-07

## (undated) RX ORDER — PROPOFOL 10 MG/ML
INJECTION, EMULSION INTRAVENOUS
Status: DISPENSED
Start: 2024-04-30

## (undated) RX ORDER — LIDOCAINE HYDROCHLORIDE 10 MG/ML
INJECTION, SOLUTION EPIDURAL; INFILTRATION; INTRACAUDAL; PERINEURAL
Status: DISPENSED
Start: 2024-04-30

## (undated) RX ORDER — DEXAMETHASONE SODIUM PHOSPHATE 10 MG/ML
INJECTION, SOLUTION INTRAMUSCULAR; INTRAVENOUS
Status: DISPENSED
Start: 2024-04-30

## (undated) RX ORDER — CEFAZOLIN SODIUM/WATER 2 G/20 ML
SYRINGE (ML) INTRAVENOUS
Status: DISPENSED
Start: 2024-06-07

## (undated) RX ORDER — LIDOCAINE HYDROCHLORIDE AND EPINEPHRINE 10; 10 MG/ML; UG/ML
INJECTION, SOLUTION INFILTRATION; PERINEURAL
Status: DISPENSED
Start: 2024-06-07

## (undated) RX ORDER — INDOCYANINE GREEN AND WATER 25 MG
KIT INJECTION
Status: DISPENSED
Start: 2024-04-30

## (undated) RX ORDER — FENTANYL CITRATE-0.9 % NACL/PF 10 MCG/ML
PLASTIC BAG, INJECTION (ML) INTRAVENOUS
Status: DISPENSED
Start: 2024-04-30

## (undated) RX ORDER — EPHEDRINE SULFATE 50 MG/ML
INJECTION, SOLUTION INTRAMUSCULAR; INTRAVENOUS; SUBCUTANEOUS
Status: DISPENSED
Start: 2024-04-30

## (undated) RX ORDER — FENTANYL CITRATE 50 UG/ML
INJECTION, SOLUTION INTRAMUSCULAR; INTRAVENOUS
Status: DISPENSED
Start: 2024-04-30

## (undated) RX ORDER — ONDANSETRON 2 MG/ML
INJECTION INTRAMUSCULAR; INTRAVENOUS
Status: DISPENSED
Start: 2024-04-30

## (undated) RX ORDER — HEPARIN SODIUM (PORCINE) LOCK FLUSH IV SOLN 100 UNIT/ML 100 UNIT/ML
SOLUTION INTRAVENOUS
Status: DISPENSED
Start: 2024-06-07

## (undated) RX ORDER — FENTANYL CITRATE 50 UG/ML
INJECTION, SOLUTION INTRAMUSCULAR; INTRAVENOUS
Status: DISPENSED
Start: 2024-06-07